# Patient Record
Sex: MALE | Race: WHITE | NOT HISPANIC OR LATINO | URBAN - METROPOLITAN AREA
[De-identification: names, ages, dates, MRNs, and addresses within clinical notes are randomized per-mention and may not be internally consistent; named-entity substitution may affect disease eponyms.]

---

## 2023-03-28 ENCOUNTER — TELEPHONE (OUTPATIENT)
Dept: PRIMARY CARE | Facility: CLINIC | Age: 53
End: 2023-03-28
Payer: COMMERCIAL

## 2023-03-28 DIAGNOSIS — E11.9 TYPE 2 DIABETES MELLITUS WITHOUT COMPLICATION, WITHOUT LONG-TERM CURRENT USE OF INSULIN (MULTI): Primary | ICD-10-CM

## 2023-03-28 RX ORDER — IBUPROFEN 800 MG/1
1 TABLET ORAL EVERY 8 HOURS PRN
COMMUNITY
Start: 2022-04-06

## 2023-03-28 RX ORDER — TADALAFIL 5 MG/1
5 TABLET ORAL DAILY
COMMUNITY

## 2023-03-28 RX ORDER — FLUOXETINE 20 MG/1
1 TABLET ORAL DAILY
COMMUNITY
Start: 2022-09-27 | End: 2023-04-10 | Stop reason: DRUGHIGH

## 2023-03-28 RX ORDER — LANCETS 33 GAUGE
EACH MISCELLANEOUS
COMMUNITY
Start: 2022-08-18 | End: 2023-03-28 | Stop reason: SDUPTHER

## 2023-03-28 RX ORDER — METFORMIN HYDROCHLORIDE 500 MG/1
500 TABLET, EXTENDED RELEASE ORAL DAILY
COMMUNITY
End: 2023-04-10 | Stop reason: SDUPTHER

## 2023-03-28 RX ORDER — ATORVASTATIN CALCIUM 10 MG/1
10 TABLET, FILM COATED ORAL DAILY
COMMUNITY
End: 2023-04-10 | Stop reason: SDUPTHER

## 2023-03-28 RX ORDER — BLOOD-GLUCOSE METER
EACH MISCELLANEOUS
Qty: 100 STRIP | Refills: 3 | Status: SHIPPED | OUTPATIENT
Start: 2023-03-28

## 2023-03-28 RX ORDER — BLOOD-GLUCOSE METER
EACH MISCELLANEOUS
COMMUNITY
End: 2023-03-28 | Stop reason: SDUPTHER

## 2023-03-28 RX ORDER — LANCETS 33 GAUGE
EACH MISCELLANEOUS
Qty: 100 EACH | Refills: 3 | Status: SHIPPED | OUTPATIENT
Start: 2023-03-28 | End: 2023-08-24

## 2023-03-28 NOTE — TELEPHONE ENCOUNTER
Patient left voicemail on provider refill line for:    Name of medication & dose:  One Touch Lancets and Test Strips  Quantity & refills requested: as per last time  Amount of medication remainin days left   If out of medication, for how long?      Last office visit:  22  Last labs (if applicable):    Future appointment?  4/10/23    Pharmacy verified.  [facility] on file  Allergies updated.       The patient was informed the requested medication request will be processed within 3 business days unless they are contacted by a staff member to advise otherwise.

## 2023-04-01 LAB
ALANINE AMINOTRANSFERASE (SGPT) (U/L) IN SER/PLAS: 27 U/L (ref 10–52)
ALBUMIN (G/DL) IN SER/PLAS: 4.4 G/DL (ref 3.4–5)
ALKALINE PHOSPHATASE (U/L) IN SER/PLAS: 52 U/L (ref 33–120)
ANION GAP IN SER/PLAS: 14 MMOL/L (ref 10–20)
ASPARTATE AMINOTRANSFERASE (SGOT) (U/L) IN SER/PLAS: 16 U/L (ref 9–39)
BILIRUBIN TOTAL (MG/DL) IN SER/PLAS: 0.5 MG/DL (ref 0–1.2)
CALCIUM (MG/DL) IN SER/PLAS: 9.2 MG/DL (ref 8.6–10.3)
CARBON DIOXIDE, TOTAL (MMOL/L) IN SER/PLAS: 27 MMOL/L (ref 21–32)
CHLORIDE (MMOL/L) IN SER/PLAS: 103 MMOL/L (ref 98–107)
CHOLESTEROL (MG/DL) IN SER/PLAS: 124 MG/DL (ref 0–199)
CHOLESTEROL IN HDL (MG/DL) IN SER/PLAS: 41.1 MG/DL
CHOLESTEROL/HDL RATIO: 3
CREATININE (MG/DL) IN SER/PLAS: 1.25 MG/DL (ref 0.5–1.3)
ERYTHROCYTE DISTRIBUTION WIDTH (RATIO) BY AUTOMATED COUNT: 13.1 % (ref 11.5–14.5)
ERYTHROCYTE MEAN CORPUSCULAR HEMOGLOBIN CONCENTRATION (G/DL) BY AUTOMATED: 32.8 G/DL (ref 32–36)
ERYTHROCYTE MEAN CORPUSCULAR VOLUME (FL) BY AUTOMATED COUNT: 91 FL (ref 80–100)
ERYTHROCYTES (10*6/UL) IN BLOOD BY AUTOMATED COUNT: 5.15 X10E12/L (ref 4.5–5.9)
ESTIMATED AVERAGE GLUCOSE FOR HBA1C: 128 MG/DL
GFR MALE: 70 ML/MIN/1.73M2
GLUCOSE (MG/DL) IN SER/PLAS: 142 MG/DL (ref 74–99)
HEMATOCRIT (%) IN BLOOD BY AUTOMATED COUNT: 46.7 % (ref 41–52)
HEMOGLOBIN (G/DL) IN BLOOD: 15.3 G/DL (ref 13.5–17.5)
HEMOGLOBIN A1C/HEMOGLOBIN TOTAL IN BLOOD: 6.1 %
LDL: 66 MG/DL (ref 0–99)
LEUKOCYTES (10*3/UL) IN BLOOD BY AUTOMATED COUNT: 9.9 X10E9/L (ref 4.4–11.3)
PLATELETS (10*3/UL) IN BLOOD AUTOMATED COUNT: 267 X10E9/L (ref 150–450)
POTASSIUM (MMOL/L) IN SER/PLAS: 4.1 MMOL/L (ref 3.5–5.3)
PROTEIN TOTAL: 6.9 G/DL (ref 6.4–8.2)
SODIUM (MMOL/L) IN SER/PLAS: 140 MMOL/L (ref 136–145)
THYROTROPIN (MIU/L) IN SER/PLAS BY DETECTION LIMIT <= 0.05 MIU/L: 1.3 MIU/L (ref 0.44–3.98)
TRIGLYCERIDE (MG/DL) IN SER/PLAS: 85 MG/DL (ref 0–149)
UREA NITROGEN (MG/DL) IN SER/PLAS: 13 MG/DL (ref 6–23)
VLDL: 17 MG/DL (ref 0–40)

## 2023-04-10 ENCOUNTER — OFFICE VISIT (OUTPATIENT)
Dept: PRIMARY CARE | Facility: CLINIC | Age: 53
End: 2023-04-10
Payer: COMMERCIAL

## 2023-04-10 VITALS
OXYGEN SATURATION: 97 % | DIASTOLIC BLOOD PRESSURE: 77 MMHG | WEIGHT: 184 LBS | SYSTOLIC BLOOD PRESSURE: 111 MMHG | HEART RATE: 86 BPM | BODY MASS INDEX: 27.25 KG/M2

## 2023-04-10 DIAGNOSIS — E78.3 MIXED HYPERGLYCERIDEMIA: ICD-10-CM

## 2023-04-10 DIAGNOSIS — E11.9 TYPE 2 DIABETES MELLITUS WITHOUT COMPLICATION, WITHOUT LONG-TERM CURRENT USE OF INSULIN (MULTI): Primary | ICD-10-CM

## 2023-04-10 DIAGNOSIS — F33.41 RECURRENT MAJOR DEPRESSIVE DISORDER, IN PARTIAL REMISSION (CMS-HCC): ICD-10-CM

## 2023-04-10 PROCEDURE — 99214 OFFICE O/P EST MOD 30 MIN: CPT | Performed by: FAMILY MEDICINE

## 2023-04-10 PROCEDURE — 1036F TOBACCO NON-USER: CPT | Performed by: FAMILY MEDICINE

## 2023-04-10 PROCEDURE — 3078F DIAST BP <80 MM HG: CPT | Performed by: FAMILY MEDICINE

## 2023-04-10 PROCEDURE — 3044F HG A1C LEVEL LT 7.0%: CPT | Performed by: FAMILY MEDICINE

## 2023-04-10 PROCEDURE — 3074F SYST BP LT 130 MM HG: CPT | Performed by: FAMILY MEDICINE

## 2023-04-10 RX ORDER — FLUOXETINE HYDROCHLORIDE 40 MG/1
40 CAPSULE ORAL DAILY
Qty: 90 CAPSULE | Refills: 1 | Status: SHIPPED | OUTPATIENT
Start: 2023-04-10 | End: 2023-10-05 | Stop reason: SDUPTHER

## 2023-04-10 RX ORDER — ATORVASTATIN CALCIUM 10 MG/1
10 TABLET, FILM COATED ORAL DAILY
Qty: 90 TABLET | Refills: 3 | Status: SHIPPED | OUTPATIENT
Start: 2023-04-10 | End: 2024-03-08 | Stop reason: SDUPTHER

## 2023-04-10 RX ORDER — METFORMIN HYDROCHLORIDE 500 MG/1
500 TABLET, EXTENDED RELEASE ORAL DAILY
Qty: 90 TABLET | Refills: 1 | Status: SHIPPED | OUTPATIENT
Start: 2023-04-10 | End: 2023-10-05 | Stop reason: SDUPTHER

## 2023-04-10 ASSESSMENT — PATIENT HEALTH QUESTIONNAIRE - PHQ9
SUM OF ALL RESPONSES TO PHQ9 QUESTIONS 1 AND 2: 0
2. FEELING DOWN, DEPRESSED OR HOPELESS: NOT AT ALL
1. LITTLE INTEREST OR PLEASURE IN DOING THINGS: NOT AT ALL

## 2023-04-10 NOTE — PROGRESS NOTES
Subjective   Patient ID: Zachary Early is a 52 y.o. male who presents for Diabetes (A1c 6.1 on 4/1/23).  He is also follow up on his depression.  His wife is with him.     He has been struggling off and on with his lifestyle and his mood.  For a while after starting the Prozac he did a lot better, but then recently he backslid.  His wife says he always feels worse in the winter, and he agrees.  Recently he started exercising again which has helped.  When he checks his blood sugar it is usually normal.  He denies any side effects from medications.    Current stressors include his job and his mother's failing health.  He has been trying to exercise.      Histories reviewed.  Recent labs reviewed and compared to last years    Objective   /77   Pulse 86   Wt 83.5 kg (184 lb)   SpO2 97%   BMI 27.25 kg/m²     Alert adult, NAD, body wt WNL  Affect pleasant and appropriate  Neck supple, No LAD, No thyromegaly  Heart RRR no murmur  Lungs CTA bilat  Skin warm dry and clear      Assessment/Plan   Problem List Items Addressed This Visit    None  Visit Diagnoses       Type 2 diabetes mellitus without complication, without long-term current use of insulin (CMS/HCC)    -  Primary    Relevant Medications    metFORMIN XR (Glucophage-XR) 500 mg 24 hr tablet    Mixed hyperglyceridemia        Relevant Medications    atorvastatin (Lipitor) 10 mg tablet    Recurrent major depressive disorder, in partial remission (CMS/HCC)        Relevant Medications    FLUoxetine (PROzac) 40 mg capsule          Diabetes well controlled, continue current medications  Follow-up in 6 months    Depression is not well controlled though improved compared to last year.  We will increase his Loxitane to 40 mg daily.  I encouraged him to limit his screen time after we reviewed his total for the past week or so.  Encouraged continued exercise and I asked him to please consider talk therapy as a treatment option.

## 2023-07-25 ENCOUNTER — TELEPHONE VISIT (OUTPATIENT)
Dept: PRIMARY CARE | Facility: CLINIC | Age: 53
End: 2023-07-25
Payer: COMMERCIAL

## 2023-07-25 DIAGNOSIS — M25.511 CHRONIC RIGHT SHOULDER PAIN: Primary | ICD-10-CM

## 2023-07-25 DIAGNOSIS — G89.29 CHRONIC RIGHT SHOULDER PAIN: Primary | ICD-10-CM

## 2023-07-25 NOTE — PROGRESS NOTES
Pt has hx of right shoulder issues.  In 2021 he was seeing PT and was referred for a Cortisone shot with Ortho which really helped.  He wants a new referral for another shot, has been having sxs.  Pt here with wife for her appt.  Referral given in person.  Socorro Duong MD

## 2023-08-18 DIAGNOSIS — E11.9 TYPE 2 DIABETES MELLITUS WITHOUT COMPLICATION, WITHOUT LONG-TERM CURRENT USE OF INSULIN (MULTI): ICD-10-CM

## 2023-08-24 RX ORDER — LANCETS 33 GAUGE
EACH MISCELLANEOUS
Qty: 100 EACH | Refills: 3 | Status: SHIPPED | OUTPATIENT
Start: 2023-08-24

## 2023-10-05 ENCOUNTER — OFFICE VISIT (OUTPATIENT)
Dept: PRIMARY CARE | Facility: CLINIC | Age: 53
End: 2023-10-05
Payer: COMMERCIAL

## 2023-10-05 VITALS
HEART RATE: 76 BPM | BODY MASS INDEX: 28.29 KG/M2 | OXYGEN SATURATION: 95 % | WEIGHT: 191 LBS | DIASTOLIC BLOOD PRESSURE: 85 MMHG | SYSTOLIC BLOOD PRESSURE: 130 MMHG

## 2023-10-05 DIAGNOSIS — E78.3 MIXED HYPERGLYCERIDEMIA: ICD-10-CM

## 2023-10-05 DIAGNOSIS — F33.41 RECURRENT MAJOR DEPRESSIVE DISORDER, IN PARTIAL REMISSION (CMS-HCC): ICD-10-CM

## 2023-10-05 DIAGNOSIS — E11.9 TYPE 2 DIABETES MELLITUS WITHOUT COMPLICATION, WITHOUT LONG-TERM CURRENT USE OF INSULIN (MULTI): Primary | ICD-10-CM

## 2023-10-05 LAB — POC HEMOGLOBIN A1C: 6.7 % (ref 4.2–6.5)

## 2023-10-05 PROCEDURE — 90750 HZV VACC RECOMBINANT IM: CPT | Performed by: FAMILY MEDICINE

## 2023-10-05 PROCEDURE — 99214 OFFICE O/P EST MOD 30 MIN: CPT | Performed by: FAMILY MEDICINE

## 2023-10-05 PROCEDURE — 3044F HG A1C LEVEL LT 7.0%: CPT | Performed by: FAMILY MEDICINE

## 2023-10-05 PROCEDURE — 83036 HEMOGLOBIN GLYCOSYLATED A1C: CPT | Performed by: FAMILY MEDICINE

## 2023-10-05 PROCEDURE — 90471 IMMUNIZATION ADMIN: CPT | Performed by: FAMILY MEDICINE

## 2023-10-05 PROCEDURE — 3079F DIAST BP 80-89 MM HG: CPT | Performed by: FAMILY MEDICINE

## 2023-10-05 PROCEDURE — 1036F TOBACCO NON-USER: CPT | Performed by: FAMILY MEDICINE

## 2023-10-05 PROCEDURE — 3075F SYST BP GE 130 - 139MM HG: CPT | Performed by: FAMILY MEDICINE

## 2023-10-05 RX ORDER — METFORMIN HYDROCHLORIDE 500 MG/1
500 TABLET, EXTENDED RELEASE ORAL DAILY
Qty: 90 TABLET | Refills: 1 | Status: SHIPPED | OUTPATIENT
Start: 2023-10-05 | End: 2024-03-08 | Stop reason: SDUPTHER

## 2023-10-05 RX ORDER — FLUOXETINE HYDROCHLORIDE 40 MG/1
40 CAPSULE ORAL DAILY
Qty: 90 CAPSULE | Refills: 1 | Status: SHIPPED | OUTPATIENT
Start: 2023-10-05 | End: 2024-03-08 | Stop reason: SDUPTHER

## 2023-10-05 NOTE — PROGRESS NOTES
Subjective   Patient ID: Zachary Early is a 52 y.o. male who presents for Diabetes (Diabetes follow up).  His BS at home has been a little worse in recent months due to eating dinner too late.  He is getting first AM BS as high as 160s at times.      Histories reviewed      Objective   /85   Pulse 76   Wt 86.6 kg (191 lb)   SpO2 95%   BMI 28.29 kg/m²    Physical Exam  Alert adult, NAD  Affect pleasant  Heart RRR no murmur  Lungs CTA bilat      Problem List Items Addressed This Visit    None  Visit Diagnoses         Codes    Type 2 diabetes mellitus without complication, without long-term current use of insulin (CMS/[facility])    -  Primary E11.9    Relevant Medications    metFORMIN  mg 24 hr tablet    Other Relevant Orders    POCT glycosylated hemoglobin (Hb A1C) manually resulted (Completed)    Basic Metabolic Panel    Hemoglobin A1C    Albumin , Urine Random    Recurrent major depressive disorder, in partial remission (CMS/[facility])     F33.41    Relevant Medications    FLUoxetine (PROzac) 40 mg capsule    Mixed hyperglyceridemia     E78.3    Relevant Orders    Lipid Panel    Basic Metabolic Panel          A1C went up a little, reviewed diet and need for more consistency  Follow up 6 months

## 2023-10-20 PROBLEM — E78.3 MIXED HYPERGLYCERIDEMIA: Status: ACTIVE | Noted: 2023-10-20

## 2023-10-20 PROBLEM — E11.9 TYPE 2 DIABETES MELLITUS WITHOUT COMPLICATION, WITHOUT LONG-TERM CURRENT USE OF INSULIN (MULTI): Status: ACTIVE | Noted: 2023-10-20

## 2023-10-20 PROBLEM — F33.41 RECURRENT MAJOR DEPRESSIVE DISORDER, IN PARTIAL REMISSION (CMS-HCC): Status: ACTIVE | Noted: 2023-10-20

## 2024-03-02 ENCOUNTER — LAB (OUTPATIENT)
Dept: LAB | Facility: LAB | Age: 54
End: 2024-03-02
Payer: COMMERCIAL

## 2024-03-02 DIAGNOSIS — E78.3 MIXED HYPERGLYCERIDEMIA: ICD-10-CM

## 2024-03-02 DIAGNOSIS — E11.9 TYPE 2 DIABETES MELLITUS WITHOUT COMPLICATION, WITHOUT LONG-TERM CURRENT USE OF INSULIN (MULTI): ICD-10-CM

## 2024-03-02 LAB
ANION GAP SERPL CALC-SCNC: 13 MMOL/L (ref 10–20)
BUN SERPL-MCNC: 10 MG/DL (ref 6–23)
CALCIUM SERPL-MCNC: 9.7 MG/DL (ref 8.6–10.3)
CHLORIDE SERPL-SCNC: 103 MMOL/L (ref 98–107)
CHOLEST SERPL-MCNC: 145 MG/DL (ref 0–199)
CHOLESTEROL/HDL RATIO: 3.5
CO2 SERPL-SCNC: 27 MMOL/L (ref 21–32)
CREAT SERPL-MCNC: 1.14 MG/DL (ref 0.5–1.3)
CREAT UR-MCNC: 181.2 MG/DL (ref 20–370)
EGFRCR SERPLBLD CKD-EPI 2021: 78 ML/MIN/1.73M*2
EST. AVERAGE GLUCOSE BLD GHB EST-MCNC: 169 MG/DL
GLUCOSE SERPL-MCNC: 184 MG/DL (ref 74–99)
HBA1C MFR BLD: 7.5 %
HDLC SERPL-MCNC: 41.9 MG/DL
LDLC SERPL CALC-MCNC: 72 MG/DL
MICROALBUMIN UR-MCNC: 7.6 MG/L
MICROALBUMIN/CREAT UR: 4.2 UG/MG CREAT
NON HDL CHOLESTEROL: 103 MG/DL (ref 0–149)
POTASSIUM SERPL-SCNC: 4.2 MMOL/L (ref 3.5–5.3)
SODIUM SERPL-SCNC: 139 MMOL/L (ref 136–145)
TRIGL SERPL-MCNC: 154 MG/DL (ref 0–149)
VLDL: 31 MG/DL (ref 0–40)

## 2024-03-02 PROCEDURE — 83036 HEMOGLOBIN GLYCOSYLATED A1C: CPT

## 2024-03-02 PROCEDURE — 36415 COLL VENOUS BLD VENIPUNCTURE: CPT

## 2024-03-02 PROCEDURE — 80061 LIPID PANEL: CPT

## 2024-03-02 PROCEDURE — 82043 UR ALBUMIN QUANTITATIVE: CPT

## 2024-03-02 PROCEDURE — 80048 BASIC METABOLIC PNL TOTAL CA: CPT

## 2024-03-02 PROCEDURE — 82570 ASSAY OF URINE CREATININE: CPT

## 2024-03-08 ENCOUNTER — OFFICE VISIT (OUTPATIENT)
Dept: PRIMARY CARE | Facility: CLINIC | Age: 54
End: 2024-03-08
Payer: COMMERCIAL

## 2024-03-08 VITALS
BODY MASS INDEX: 30.21 KG/M2 | DIASTOLIC BLOOD PRESSURE: 81 MMHG | OXYGEN SATURATION: 95 % | HEART RATE: 80 BPM | HEIGHT: 69 IN | SYSTOLIC BLOOD PRESSURE: 119 MMHG | WEIGHT: 204 LBS

## 2024-03-08 DIAGNOSIS — H93.13 TINNITUS OF BOTH EARS: ICD-10-CM

## 2024-03-08 DIAGNOSIS — F33.41 RECURRENT MAJOR DEPRESSIVE DISORDER, IN PARTIAL REMISSION (CMS-HCC): ICD-10-CM

## 2024-03-08 DIAGNOSIS — Z12.11 SCREENING FOR COLON CANCER: ICD-10-CM

## 2024-03-08 DIAGNOSIS — E78.3 MIXED HYPERGLYCERIDEMIA: ICD-10-CM

## 2024-03-08 DIAGNOSIS — E11.9 TYPE 2 DIABETES MELLITUS WITHOUT COMPLICATION, WITHOUT LONG-TERM CURRENT USE OF INSULIN (MULTI): ICD-10-CM

## 2024-03-08 DIAGNOSIS — Z00.00 WELL ADULT EXAM: Primary | ICD-10-CM

## 2024-03-08 DIAGNOSIS — H91.93 DECREASED HEARING OF BOTH EARS: ICD-10-CM

## 2024-03-08 PROCEDURE — 99214 OFFICE O/P EST MOD 30 MIN: CPT | Performed by: FAMILY MEDICINE

## 2024-03-08 PROCEDURE — 3074F SYST BP LT 130 MM HG: CPT | Performed by: FAMILY MEDICINE

## 2024-03-08 PROCEDURE — 3079F DIAST BP 80-89 MM HG: CPT | Performed by: FAMILY MEDICINE

## 2024-03-08 PROCEDURE — 3061F NEG MICROALBUMINURIA REV: CPT | Performed by: FAMILY MEDICINE

## 2024-03-08 PROCEDURE — 3048F LDL-C <100 MG/DL: CPT | Performed by: FAMILY MEDICINE

## 2024-03-08 PROCEDURE — 3051F HG A1C>EQUAL 7.0%<8.0%: CPT | Performed by: FAMILY MEDICINE

## 2024-03-08 PROCEDURE — 1036F TOBACCO NON-USER: CPT | Performed by: FAMILY MEDICINE

## 2024-03-08 PROCEDURE — 99396 PREV VISIT EST AGE 40-64: CPT | Performed by: FAMILY MEDICINE

## 2024-03-08 RX ORDER — ATORVASTATIN CALCIUM 10 MG/1
10 TABLET, FILM COATED ORAL DAILY
Qty: 90 TABLET | Refills: 3 | Status: SHIPPED | OUTPATIENT
Start: 2024-03-08

## 2024-03-08 RX ORDER — METFORMIN HYDROCHLORIDE 500 MG/1
500 TABLET, EXTENDED RELEASE ORAL DAILY
Qty: 90 TABLET | Refills: 1 | Status: SHIPPED | OUTPATIENT
Start: 2024-03-08

## 2024-03-08 RX ORDER — FLUOXETINE HYDROCHLORIDE 40 MG/1
40 CAPSULE ORAL DAILY
Qty: 90 CAPSULE | Refills: 1 | Status: SHIPPED | OUTPATIENT
Start: 2024-03-08 | End: 2024-09-04

## 2024-03-08 ASSESSMENT — PATIENT HEALTH QUESTIONNAIRE - PHQ9
SUM OF ALL RESPONSES TO PHQ9 QUESTIONS 1 AND 2: 0
1. LITTLE INTEREST OR PLEASURE IN DOING THINGS: NOT AT ALL
2. FEELING DOWN, DEPRESSED OR HOPELESS: NOT AT ALL

## 2024-03-08 ASSESSMENT — ENCOUNTER SYMPTOMS
OCCASIONAL FEELINGS OF UNSTEADINESS: 0
DEPRESSION: 0
LOSS OF SENSATION IN FEET: 0

## 2024-03-08 NOTE — PROGRESS NOTES
"  Subjective   Patient ID: Zachary Earyl is a 53 y.o. male who presents for Annual Exam (Patient is here for annual physical. He is worried about the ringing in his ears, says its worse on the right.).  He is also due for DM follow up.    Past Medical, Surgical, Social and Family Hx reviewed-Yes    Any acute complaints?    Just the tinnitus    Any chronic issues addressed today or Rx refills needed?    Hyperlipidemia and type 2 DM      Colon CA screening Hx   Labs recent labs reviewed  Up to date on immunizations yes  Dentist in the past year yes    ROS:  HEENT negative  GI negative   negative  Cardiac negative  Resp negative  Sexual Activity no concerns  Skin negative      PE:  /81   Pulse 80   Ht 1.753 m (5' 9\")   Wt 92.5 kg (204 lb)   SpO2 95%   BMI 30.13 kg/m²   Alert adult man, NAD  HEENT clear  Neck supple, No LAD  Heart RRR no murmur  Lungs CTA bilat  Abdomen benign, normal BS, soft NT/ND  Skin warm, dry, clear  Neuro grossly normal, gait WNL  Affect pleasant and appropriate, memory and judgement WNL      Assessment/Plan   Problem List Items Addressed This Visit             ICD-10-CM    Type 2 diabetes mellitus without complication, without long-term current use of insulin (CMS/HCC) E11.9    Relevant Medications    metFORMIN  mg 24 hr tablet    Recurrent major depressive disorder, in partial remission (CMS/HCC) F33.41    Relevant Medications    FLUoxetine (PROzac) 40 mg capsule    Mixed hyperglyceridemia E78.3    Relevant Medications    atorvastatin (Lipitor) 10 mg tablet     Other Visit Diagnoses         Codes    Well adult exam    -  Primary Z00.00    Decreased hearing of both ears     H91.93    Relevant Orders    Referral to Audiology    Tinnitus of both ears     H93.13    Relevant Orders    Referral to Audiology    Screening for colon cancer     Z12.11    Relevant Orders    Colonoscopy Screening; Average Risk Patient                 "

## 2024-09-13 ENCOUNTER — APPOINTMENT (OUTPATIENT)
Dept: PRIMARY CARE | Facility: CLINIC | Age: 54
End: 2024-09-13
Payer: COMMERCIAL

## 2024-09-13 VITALS
DIASTOLIC BLOOD PRESSURE: 84 MMHG | SYSTOLIC BLOOD PRESSURE: 125 MMHG | OXYGEN SATURATION: 95 % | BODY MASS INDEX: 30.86 KG/M2 | WEIGHT: 209 LBS

## 2024-09-13 DIAGNOSIS — F33.41 RECURRENT MAJOR DEPRESSIVE DISORDER, IN PARTIAL REMISSION (CMS-HCC): ICD-10-CM

## 2024-09-13 DIAGNOSIS — T17.308A CHOKING, INITIAL ENCOUNTER: ICD-10-CM

## 2024-09-13 DIAGNOSIS — E11.9 TYPE 2 DIABETES MELLITUS WITHOUT COMPLICATION, WITHOUT LONG-TERM CURRENT USE OF INSULIN (MULTI): Primary | ICD-10-CM

## 2024-09-13 DIAGNOSIS — R53.83 OTHER FATIGUE: ICD-10-CM

## 2024-09-13 DIAGNOSIS — E78.3 MIXED HYPERGLYCERIDEMIA: ICD-10-CM

## 2024-09-13 PROCEDURE — 90471 IMMUNIZATION ADMIN: CPT | Performed by: FAMILY MEDICINE

## 2024-09-13 PROCEDURE — 1036F TOBACCO NON-USER: CPT | Performed by: FAMILY MEDICINE

## 2024-09-13 PROCEDURE — 3048F LDL-C <100 MG/DL: CPT | Performed by: FAMILY MEDICINE

## 2024-09-13 PROCEDURE — 3061F NEG MICROALBUMINURIA REV: CPT | Performed by: FAMILY MEDICINE

## 2024-09-13 PROCEDURE — 3074F SYST BP LT 130 MM HG: CPT | Performed by: FAMILY MEDICINE

## 2024-09-13 PROCEDURE — 90715 TDAP VACCINE 7 YRS/> IM: CPT | Performed by: FAMILY MEDICINE

## 2024-09-13 PROCEDURE — 99214 OFFICE O/P EST MOD 30 MIN: CPT | Performed by: FAMILY MEDICINE

## 2024-09-13 PROCEDURE — 3051F HG A1C>EQUAL 7.0%<8.0%: CPT | Performed by: FAMILY MEDICINE

## 2024-09-13 PROCEDURE — 3079F DIAST BP 80-89 MM HG: CPT | Performed by: FAMILY MEDICINE

## 2024-09-13 RX ORDER — FLUOXETINE HYDROCHLORIDE 40 MG/1
40 CAPSULE ORAL DAILY
Qty: 90 CAPSULE | Refills: 1 | Status: SHIPPED | OUTPATIENT
Start: 2024-09-13 | End: 2025-03-12

## 2024-09-13 RX ORDER — METFORMIN HYDROCHLORIDE 500 MG/1
1000 TABLET, EXTENDED RELEASE ORAL DAILY
Qty: 180 TABLET | Refills: 1 | Status: SHIPPED | OUTPATIENT
Start: 2024-09-13

## 2024-09-13 NOTE — PROGRESS NOTES
Subjective   Patient ID: Zachary Early is a 53 y.o. male who presents for Diabetes (Patient is here for diabetes follow up. He would like to discuss his choking issues. ).  Wife is with him.      Due to some family stresses they have been eating less healthy    He checks his BS at home but always in the AM first thing.  Fasting has been around 150-160s.      He says he has always had coking issues with pills or food, ever since he was young.  It happens every few months or so.  He even had to call 911 a few years ago when he choked on meat.  Other times they have driven to the ER in the past year, but then the choking was relieved in the parking lot.      His daughter had surgery years ago for a vascular ring, but he never brought it up.    Histories reviewed      Objective   /84   Wt 94.8 kg (209 lb)   SpO2 95%   BMI 30.86 kg/m²    Physical Exam    Alert adult, NAD  Affect pleasant  Heart RRR no murmur  Lungs CTA bilat    HgbA1C today 8.6    Problem List Items Addressed This Visit             ICD-10-CM    Type 2 diabetes mellitus without complication, without long-term current use of insulin (Multi) - Primary E11.9    Relevant Medications    metFORMIN  mg 24 hr tablet    Other Relevant Orders    Comprehensive Metabolic Panel    Hemoglobin A1C    Recurrent major depressive disorder, in partial remission (CMS-HCC) F33.41    Relevant Medications    FLUoxetine (PROzac) 40 mg capsule    Mixed hyperglyceridemia E78.3    Relevant Orders    Lipid Panel    Comprehensive Metabolic Panel     Other Visit Diagnoses         Codes    Choking, initial encounter     T17.308A    Relevant Orders    Referral to ENT    Other fatigue     R53.83    Relevant Orders    Testosterone, total and free          Pt agreeable to increasing metformin to bid, but he is very motivated  to correct his diet also.     Follow up 6 months

## 2024-09-16 ENCOUNTER — ANESTHESIA EVENT (OUTPATIENT)
Dept: OPERATING ROOM | Facility: CLINIC | Age: 54
End: 2024-09-16
Payer: COMMERCIAL

## 2024-09-18 ENCOUNTER — ANESTHESIA (OUTPATIENT)
Dept: OPERATING ROOM | Facility: CLINIC | Age: 54
End: 2024-09-18
Payer: COMMERCIAL

## 2024-09-18 ENCOUNTER — HOSPITAL ENCOUNTER (OUTPATIENT)
Dept: OPERATING ROOM | Facility: CLINIC | Age: 54
Discharge: HOME | End: 2024-09-18
Payer: COMMERCIAL

## 2024-09-18 VITALS
HEIGHT: 70 IN | OXYGEN SATURATION: 95 % | RESPIRATION RATE: 16 BRPM | BODY MASS INDEX: 29.67 KG/M2 | WEIGHT: 207.23 LBS | SYSTOLIC BLOOD PRESSURE: 118 MMHG | HEART RATE: 64 BPM | DIASTOLIC BLOOD PRESSURE: 79 MMHG | TEMPERATURE: 96.8 F

## 2024-09-18 DIAGNOSIS — Z12.11 SCREENING FOR COLON CANCER: ICD-10-CM

## 2024-09-18 LAB — GLUCOSE BLD MANUAL STRIP-MCNC: 190 MG/DL (ref 74–99)

## 2024-09-18 PROCEDURE — 3600000002 HC OR TIME - INITIAL BASE CHARGE - PROCEDURE LEVEL TWO: Performed by: ANESTHESIOLOGY

## 2024-09-18 PROCEDURE — 7100000009 HC PHASE TWO TIME - INITIAL BASE CHARGE: Performed by: ANESTHESIOLOGY

## 2024-09-18 PROCEDURE — 7100000010 HC PHASE TWO TIME - EACH INCREMENTAL 1 MINUTE: Performed by: ANESTHESIOLOGY

## 2024-09-18 PROCEDURE — 45385 COLONOSCOPY W/LESION REMOVAL: CPT | Performed by: INTERNAL MEDICINE

## 2024-09-18 PROCEDURE — 3600000007 HC OR TIME - EACH INCREMENTAL 1 MINUTE - PROCEDURE LEVEL TWO: Performed by: ANESTHESIOLOGY

## 2024-09-18 PROCEDURE — A45385 PR COLONOSCOPY,REMV LESN,SNARE

## 2024-09-18 PROCEDURE — 82947 ASSAY GLUCOSE BLOOD QUANT: CPT

## 2024-09-18 PROCEDURE — 3700000001 HC GENERAL ANESTHESIA TIME - INITIAL BASE CHARGE: Performed by: ANESTHESIOLOGY

## 2024-09-18 PROCEDURE — 2500000004 HC RX 250 GENERAL PHARMACY W/ HCPCS (ALT 636 FOR OP/ED)

## 2024-09-18 PROCEDURE — A45385 PR COLONOSCOPY,REMV LESN,SNARE: Performed by: ANESTHESIOLOGY

## 2024-09-18 PROCEDURE — 3700000002 HC GENERAL ANESTHESIA TIME - EACH INCREMENTAL 1 MINUTE: Performed by: ANESTHESIOLOGY

## 2024-09-18 RX ORDER — ALBUTEROL SULFATE 0.83 MG/ML
2.5 SOLUTION RESPIRATORY (INHALATION) ONCE AS NEEDED
Status: DISCONTINUED | OUTPATIENT
Start: 2024-09-18 | End: 2024-09-19 | Stop reason: HOSPADM

## 2024-09-18 RX ORDER — ONDANSETRON HYDROCHLORIDE 2 MG/ML
4 INJECTION, SOLUTION INTRAVENOUS ONCE AS NEEDED
Status: DISCONTINUED | OUTPATIENT
Start: 2024-09-18 | End: 2024-09-19 | Stop reason: HOSPADM

## 2024-09-18 RX ORDER — SODIUM CHLORIDE, SODIUM LACTATE, POTASSIUM CHLORIDE, CALCIUM CHLORIDE 600; 310; 30; 20 MG/100ML; MG/100ML; MG/100ML; MG/100ML
100 INJECTION, SOLUTION INTRAVENOUS CONTINUOUS
Status: DISCONTINUED | OUTPATIENT
Start: 2024-09-18 | End: 2024-09-19 | Stop reason: HOSPADM

## 2024-09-18 RX ORDER — ACETAMINOPHEN 325 MG/1
650 TABLET ORAL EVERY 4 HOURS PRN
Status: DISCONTINUED | OUTPATIENT
Start: 2024-09-18 | End: 2024-09-19 | Stop reason: HOSPADM

## 2024-09-18 RX ORDER — LIDOCAINE IN NACL,ISO-OSMOT/PF 30 MG/3 ML
0.1 SYRINGE (ML) INJECTION ONCE
Status: DISCONTINUED | OUTPATIENT
Start: 2024-09-18 | End: 2024-09-19 | Stop reason: HOSPADM

## 2024-09-18 RX ORDER — PROPOFOL 10 MG/ML
INJECTION, EMULSION INTRAVENOUS CONTINUOUS PRN
Status: DISCONTINUED | OUTPATIENT
Start: 2024-09-18 | End: 2024-09-18

## 2024-09-18 SDOH — HEALTH STABILITY: MENTAL HEALTH: CURRENT SMOKER: 0

## 2024-09-18 ASSESSMENT — PAIN - FUNCTIONAL ASSESSMENT
PAIN_FUNCTIONAL_ASSESSMENT: 0-10

## 2024-09-18 ASSESSMENT — COLUMBIA-SUICIDE SEVERITY RATING SCALE - C-SSRS
6. HAVE YOU EVER DONE ANYTHING, STARTED TO DO ANYTHING, OR PREPARED TO DO ANYTHING TO END YOUR LIFE?: NO
1. IN THE PAST MONTH, HAVE YOU WISHED YOU WERE DEAD OR WISHED YOU COULD GO TO SLEEP AND NOT WAKE UP?: NO
2. HAVE YOU ACTUALLY HAD ANY THOUGHTS OF KILLING YOURSELF?: NO

## 2024-09-18 ASSESSMENT — PAIN SCALES - GENERAL
PAINLEVEL_OUTOF10: 0 - NO PAIN

## 2024-09-18 NOTE — DISCHARGE INSTRUCTIONS
During the first 24 hours after your procedure, you should:    - Resume normal diet, unless otherwise directed by your doctor.  - Resume your home medications, unless otherwise directed by your doctor.  - Refrain from driving or operative heavy machinery.  - Drink plenty of liquids.  - Avoid consuming alcohol.  - Avoid strenuous activity or heavy lifting.    After 24 hours, you can resume regular activity.    Call your doctor office immediately ([phone]) or come to the nearest emergency room if you experience:    - Abdominal tenderness  - Blood in your stool or vomit  - Difficulty urinating or passing stools  - Difficulty breathing  - Chest pain  - Fever

## 2024-09-18 NOTE — ANESTHESIA POSTPROCEDURE EVALUATION
Patient: Zachary Early    Procedure Summary       Date: 09/18/24 Room / Location: [facility] OR    Anesthesia Start: 0855 Anesthesia Stop: 0923    Procedure: COLONOSCOPY Diagnosis: Screening for colon cancer    Scheduled Providers: Jean-Claude Cevallos MD Responsible Provider: Laurie Cole MD    Anesthesia Type: MAC ASA Status: 3            Anesthesia Type: MAC    Vitals Value Taken Time   /79 09/18/24 0951   Temp 36 °C (96.8 °F) 09/18/24 0951   Pulse 64 09/18/24 0951   Resp 16 09/18/24 0951   SpO2 95 % 09/18/24 0951       Anesthesia Post Evaluation    Patient location during evaluation: PACU  Patient participation: complete - patient participated  Level of consciousness: awake and alert  Pain management: adequate  Airway patency: patent  Cardiovascular status: acceptable  Respiratory status: acceptable  Hydration status: acceptable  Postoperative Nausea and Vomiting: none        There were no known notable events for this encounter.

## 2024-09-18 NOTE — ANESTHESIA PREPROCEDURE EVALUATION
Patient: Zachary Early    Procedure Information       Anesthesia Start Date/Time: 09/18/24 0855    Scheduled providers: Jean-Claude Cevallos MD    Procedure: COLONOSCOPY    Location: [facility] OR          54 y/o male here for colonoscopy     Relevant Problems   Cardiac   (+) Mixed hyperglyceridemia      Neuro   (+) Recurrent major depressive disorder, in partial remission (CMS-HCC)      Endocrine   (+) Type 2 diabetes mellitus without complication, without long-term current use of insulin (Multi)     Vitals:    09/18/24 0809   BP: 113/74   Pulse: 64   Resp: 18   Temp: 36.1 °C (97 °F)   SpO2: 96%       Past Surgical History:   Procedure Laterality Date    OTHER SURGICAL HISTORY  05/12/2021    Cholecystectomy     Past Medical History:   Diagnosis Date    Calculus of gallbladder without cholecystitis without obstruction 04/27/2021    Gallstones    Overweight 05/29/2021    Overweight    Personal history of other diseases of the digestive system 04/27/2021    History of gastritis    Type 2 diabetes mellitus with hyperglycemia (Multi) 08/20/2021    Uncontrolled type 2 diabetes mellitus with hyperglycemia       Current Outpatient Medications:     atorvastatin (Lipitor) 10 mg tablet, Take 1 tablet (10 mg) by mouth once daily., Disp: 90 tablet, Rfl: 3    FLUoxetine (PROzac) 40 mg capsule, Take 1 capsule (40 mg) by mouth once daily., Disp: 90 capsule, Rfl: 1    metFORMIN  mg 24 hr tablet, Take 2 tablets (1,000 mg) by mouth once daily., Disp: 180 tablet, Rfl: 1    OneTouch Delica Plus Lancet 30 gauge misc, use daily as directed for type 2 diabetes, Disp: 100 each, Rfl: 3    OneTouch Verio test strips strip, use to test once daily, Disp: 100 strip, Rfl: 3     mg tablet, Take 1 tablet (800 mg) by mouth every 8 hours if needed for pain., Disp: , Rfl:     tadalafil (Cialis) 5 mg tablet, Take 1 tablet (5 mg) by mouth once daily., Disp: , Rfl:   No current facility-administered medications for this  encounter.    Facility-Administered Medications Ordered in Other Encounters:     lactated Ringer's bolus, , intravenous, Continuous PRN, VERÓNICA Betancourt, Stopped at 09/18/24 0916    propofol (Diprivan) bolus from bag, , intravenous, PRN, VERÓNICA Betancourt, 20 mg at 09/18/24 0859    propofol (Diprivan) infusion, , intravenous, Continuous PRN, VERÓNICA Betancourt, Stopped at 09/18/24 0915  Prior to Admission medications    Medication Sig Start Date End Date Taking? Authorizing Provider   atorvastatin (Lipitor) 10 mg tablet Take 1 tablet (10 mg) by mouth once daily. 3/8/24  Yes Socorro Duong MD   FLUoxetine (PROzac) 40 mg capsule Take 1 capsule (40 mg) by mouth once daily. 9/13/24 3/12/25 Yes Socorro Duong MD   metFORMIN  mg 24 hr tablet Take 2 tablets (1,000 mg) by mouth once daily. 9/13/24  Yes MD Frances WareTouch Delica Plus Lancet 30 gauge misc use daily as directed for type 2 diabetes 8/24/23  Yes MD Frances WareTouch Verio test strips strip use to test once daily 3/28/23  Yes Socorro Duong MD    mg tablet Take 1 tablet (800 mg) by mouth every 8 hours if needed for pain. 4/6/22   Historical Provider, MD   tadalafil (Cialis) 5 mg tablet Take 1 tablet (5 mg) by mouth once daily.    Historical Provider, MD   FLUoxetine (PROzac) 40 mg capsule Take 1 capsule (40 mg) by mouth once daily. 3/8/24 9/13/24  Socorro Duong MD   metFORMIN  mg 24 hr tablet Take 1 tablet (500 mg) by mouth once daily. 3/8/24 9/13/24  Socorro Duong MD     No Known Allergies  Social History     Tobacco Use    Smoking status: Never    Smokeless tobacco: Never   Substance Use Topics    Alcohol use: Yes         Chemistry    Lab Results   Component Value Date/Time     03/02/2024 0917    K 4.2 03/02/2024 0917     03/02/2024 0917    CO2 27 03/02/2024 0917    BUN 10 03/02/2024 0917    CREATININE 1.14 03/02/2024 0917    Lab Results   Component Value Date/Time    CALCIUM  9.7 03/02/2024 0917    ALKPHOS 52 04/01/2023 0855    AST 16 04/01/2023 0855    ALT 27 04/01/2023 0855    BILITOT 0.5 04/01/2023 0855          Lab Results   Component Value Date/Time    WBC 9.9 04/01/2023 0855    HGB 15.3 04/01/2023 0855    HCT 46.7 04/01/2023 0855     04/01/2023 0855       Clinical information reviewed:   Tobacco  Allergies  Meds   Med Hx  Surg Hx   Fam Hx  Soc Hx        NPO Detail:  NPO/Void Status  Date of Last Liquid: 09/18/24  Time of Last Liquid: 0600  Date of Last Solid: 09/17/24  Time of Last Solid: 0800         Physical Exam    Airway  Mallampati: IV  TM distance: >3 FB  Neck ROM: full     Cardiovascular - normal exam  Rhythm: regular     Dental - normal exam     Pulmonary - normal exam     Abdominal - normal exam             Anesthesia Plan    History of general anesthesia?: yes  History of complications of general anesthesia?: no    ASA 3     MAC   (GA-TIVA)  The patient is not a current smoker.    intravenous induction   Anesthetic plan and risks discussed with patient.    Plan discussed with attending and CAA.

## 2024-09-18 NOTE — H&P
Subjective     History of Present Illness:   Zachary Early is a 53 y.o. male who presents to endoscopy for his initial average-risk screening colonoscopy.  Patient denies having any GI symptoms.      Review of Systems  Constitutional: denies in acute distress  Cardiovascular: denies chest pain  Respiratory: denies shortness of breath  GI: see HPI  Neurologic: denies altered mental status  Dermatology: denies jaundice    Past Medical History   has a past medical history of Calculus of gallbladder without cholecystitis without obstruction (04/27/2021), Overweight (05/29/2021), Personal history of other diseases of the digestive system (04/27/2021), and Type 2 diabetes mellitus with hyperglycemia (Multi) (08/20/2021).     Social History   reports that he has never smoked. He has never used smokeless tobacco. He reports current alcohol use. He reports that he does not use drugs.     Family History  family history is not on file.     Allergies  No Known Allergies    Medications  Current Outpatient Medications   Medication Instructions    atorvastatin (LIPITOR) 10 mg, oral, Daily    FLUoxetine (PROZAC) 40 mg, oral, Daily     mg tablet 1 tablet, oral, Every 8 hours PRN    metFORMIN XR (GLUCOPHAGE-XR) 1,000 mg, oral, Daily    OneTouch Delica Plus Lancet 30 gauge misc use daily as directed for type 2 diabetes    OneTouch Verio test strips strip use to test once daily    tadalafil (CIALIS) 5 mg, oral, Daily        Objective   Visit Vitals  /74   Pulse 64   Temp 36.1 °C (97 °F) (Temporal)   Resp 18        Physical Exam  General: not in acute distress  CV: regular rate and rhythm  Resp: non-labored breathing  GI: soft, active bowel sounds, non-tender to palpation, no rebound or guarding  Msk: moving all extremities   Derm: no jaundice    Labs  Lab Results   Component Value Date    WBC 9.9 04/01/2023    HGB 15.3 04/01/2023    HCT 46.7 04/01/2023    MCV 91 04/01/2023     04/01/2023     Lab Results   Component  "Value Date    GLUCOSE 184 (H) 03/02/2024    CALCIUM 9.7 03/02/2024     03/02/2024    K 4.2 03/02/2024    CO2 27 03/02/2024     03/02/2024    BUN 10 03/02/2024    CREATININE 1.14 03/02/2024     Lab Results   Component Value Date    ALT 27 04/01/2023    AST 16 04/01/2023    ALKPHOS 52 04/01/2023    BILITOT 0.5 04/01/2023     No results found for: \"INR\", \"PROTIME\"    Assessment/Plan   Zachary Early is a 53 y.o. male who presents to endoscopy for his initial average-risk screening colonoscopy.         Jean-Claude Cevallos MD  "

## 2024-09-25 LAB
LABORATORY COMMENT REPORT: NORMAL
PATH REPORT.FINAL DX SPEC: NORMAL
PATH REPORT.GROSS SPEC: NORMAL
PATH REPORT.RELEVANT HX SPEC: NORMAL
PATH REPORT.TOTAL CANCER: NORMAL

## 2024-11-07 NOTE — RESULT ENCOUNTER NOTE
Dear Zachary,    The single polyp that was resected during your recent colonoscopy was a completely benign hyperplastic polyp.  I recommend repeat colonoscopy in 10 years for routine screening.   Sincerely,  Jean-Claude Cevallos MD

## 2024-11-21 ENCOUNTER — PATIENT MESSAGE (OUTPATIENT)
Dept: PRIMARY CARE | Facility: CLINIC | Age: 54
End: 2024-11-21
Payer: COMMERCIAL

## 2024-11-21 DIAGNOSIS — E11.9 TYPE 2 DIABETES MELLITUS WITHOUT COMPLICATION, WITHOUT LONG-TERM CURRENT USE OF INSULIN (MULTI): ICD-10-CM

## 2024-11-21 RX ORDER — METFORMIN HYDROCHLORIDE 1000 MG/1
1000 TABLET, FILM COATED, EXTENDED RELEASE ORAL
Qty: 180 TABLET | Refills: 0 | Status: SHIPPED | OUTPATIENT
Start: 2024-11-21

## 2024-11-22 ENCOUNTER — TELEPHONE (OUTPATIENT)
Dept: PRIMARY CARE | Facility: CLINIC | Age: 54
End: 2024-11-22
Payer: COMMERCIAL

## 2024-11-22 NOTE — TELEPHONE ENCOUNTER
Fax received from pharmacy, Metformin not covered, insurance will cover Glucophage XR 500mg 2 tabs PO BID or Metformin can be PA. Did you want to switch or MA to do PA?

## 2024-11-22 NOTE — TELEPHONE ENCOUNTER
Assessment:     Well adolescent  1  Health check for child over 34 days old     2  Exercise counseling     3  Nutritional counseling     4  Allergic conjunctivitis, unspecified laterality  ketotifen (ZADITOR) 0 025 % ophthalmic solution   5  Encounter for immunization  HPV VACCINE 9 VALENT IM (GARDASIL)        Plan:         1  Anticipatory guidance discussed  Specific topics reviewed: bicycle helmets, drugs, ETOH, and tobacco, importance of regular dental care, importance of regular exercise, importance of varied diet, limit TV, media violence, minimize junk food, puberty, seat belts and sex; STD and pregnancy prevention  2  Development: appropriate for age    1  Immunizations today: per orders  4  Follow-up visit in 1 year for next well child visit, or sooner as needed  5  Gyn: not sexually active  Counseled on importance of safe sexual practices  Menearche: 17year old  Menses: every month and last 4-5 days  Moderate bleeding    Subjective:     Lurdes Ochoa is a 15 y o  female who is here for this well-child visit  Current Issues:  Current concerns include conjunctival injection that waxes and wanes  Discussed potential causes of conjunctivitis  No sick contacts  Improves with visine allergic drops  regular periods, no issues and menarche at 17years old    The following portions of the patient's history were reviewed and updated as appropriate: allergies, current medications, past family history, past medical history, past social history, past surgical history and problem list     Well Child Assessment:  History was provided by the mother  Kiana lives with her mother and brother  Interval problems do not include chronic stress at home, recent illness or recent injury  Nutrition  Types of intake include fruits, vegetables and eggs  Dental  The patient has a dental home  The patient brushes teeth regularly  The patient flosses regularly  Last dental exam was 6-12 months ago  Request for PA for [employer] received via fax. Placed in MA task basket    Elimination  Elimination problems do not include constipation or diarrhea  Behavioral  Behavioral issues do not include hitting, lying frequently, misbehaving with peers, misbehaving with siblings or performing poorly at school  Sleep  Average sleep duration is 10 hours  The patient does not snore  There are no sleep problems  Safety  There is no smoking in the home  Home has working smoke alarms? yes  Home has working carbon monoxide alarms? yes  There is no gun in home  School  Current grade level is 10th  Current school district is [employer]  There are no signs of learning disabilities  Child is doing well in school  Screening  There are no risk factors related to diet  There are no risk factors at school  Social  Sibling interactions are good  Objective: There were no vitals filed for this visit  Growth parameters are noted and are appropriate for age  Wt Readings from Last 1 Encounters:   03/04/20 49 9 kg (110 lb) (44 %, Z= -0 15)*     * Growth percentiles are based on CDC (Girls, 2-20 Years) data  Ht Readings from Last 1 Encounters:   08/02/19 4' 11 6" (8 %, Z= -1 40)*     * Growth percentiles are based on CDC (Girls, 2-20 Years) data  There is no height or weight on file to calculate BMI  There were no vitals filed for this visit  No exam data present    Physical Exam  Constitutional:       General: She is not in acute distress  Appearance: She is well-developed  She is not diaphoretic  HENT:      Head: Normocephalic and atraumatic  Right Ear: Tympanic membrane, ear canal and external ear normal  There is no impacted cerumen  Left Ear: Tympanic membrane, ear canal and external ear normal  There is no impacted cerumen  Nose: Nose normal       Mouth/Throat:      Mouth: Mucous membranes are moist       Pharynx: Oropharynx is clear  No oropharyngeal exudate  Eyes:      Pupils: Pupils are equal, round, and reactive to light     Neck: Vascular: No JVD  Cardiovascular:      Rate and Rhythm: Normal rate and regular rhythm  Heart sounds: Normal heart sounds  No murmur  No friction rub  No gallop  Pulmonary:      Effort: Pulmonary effort is normal  No respiratory distress  Breath sounds: Normal breath sounds  No wheezing or rales  Chest:      Chest wall: No tenderness  Abdominal:      General: Bowel sounds are normal  There is no distension  Palpations: Abdomen is soft  Tenderness: There is no abdominal tenderness  There is no guarding or rebound  Musculoskeletal: Normal range of motion  General: No swelling, tenderness or signs of injury  Right lower leg: No edema  Left lower leg: No edema  Lymphadenopathy:      Cervical: No cervical adenopathy  Skin:     General: Skin is warm and dry  Coloration: Skin is not jaundiced or pale  Findings: No erythema or rash  Neurological:      Mental Status: She is alert and oriented to person, place, and time  Cranial Nerves: No cranial nerve deficit     Psychiatric:         Behavior: Behavior normal

## 2025-03-09 LAB
ALBUMIN SERPL-MCNC: 4.3 G/DL (ref 3.6–5.1)
ALP SERPL-CCNC: 58 U/L (ref 35–144)
ALT SERPL-CCNC: 62 U/L (ref 9–46)
ANION GAP SERPL CALCULATED.4IONS-SCNC: 8 MMOL/L (CALC) (ref 7–17)
AST SERPL-CCNC: 28 U/L (ref 10–35)
BILIRUB SERPL-MCNC: 0.7 MG/DL (ref 0.2–1.2)
BUN SERPL-MCNC: 9 MG/DL (ref 7–25)
CALCIUM SERPL-MCNC: 9 MG/DL (ref 8.6–10.3)
CHLORIDE SERPL-SCNC: 100 MMOL/L (ref 98–110)
CHOLEST SERPL-MCNC: 126 MG/DL
CHOLEST/HDLC SERPL: 3.1 (CALC)
CO2 SERPL-SCNC: 28 MMOL/L (ref 20–32)
CREAT SERPL-MCNC: 0.84 MG/DL (ref 0.7–1.3)
EGFRCR SERPLBLD CKD-EPI 2021: 105 ML/MIN/1.73M2
EST. AVERAGE GLUCOSE BLD GHB EST-MCNC: 214 MG/DL
EST. AVERAGE GLUCOSE BLD GHB EST-SCNC: 11.9 MMOL/L
GLUCOSE SERPL-MCNC: 215 MG/DL (ref 65–99)
HBA1C MFR BLD: 9.1 % OF TOTAL HGB
HDLC SERPL-MCNC: 41 MG/DL
LDLC SERPL CALC-MCNC: 65 MG/DL (CALC)
NONHDLC SERPL-MCNC: 85 MG/DL (CALC)
POTASSIUM SERPL-SCNC: 4.3 MMOL/L (ref 3.5–5.3)
PROT SERPL-MCNC: 6.7 G/DL (ref 6.1–8.1)
SODIUM SERPL-SCNC: 136 MMOL/L (ref 135–146)
TESTOST FREE SERPL-MCNC: NORMAL PG/ML
TESTOST SERPL-MCNC: NORMAL NG/DL
TRIGL SERPL-MCNC: 112 MG/DL

## 2025-03-10 DIAGNOSIS — E78.3 MIXED HYPERGLYCERIDEMIA: ICD-10-CM

## 2025-03-10 DIAGNOSIS — F33.41 RECURRENT MAJOR DEPRESSIVE DISORDER, IN PARTIAL REMISSION (CMS-HCC): ICD-10-CM

## 2025-03-14 ENCOUNTER — APPOINTMENT (OUTPATIENT)
Dept: PRIMARY CARE | Facility: CLINIC | Age: 55
End: 2025-03-14
Payer: COMMERCIAL

## 2025-03-14 VITALS
HEART RATE: 88 BPM | WEIGHT: 207 LBS | OXYGEN SATURATION: 96 % | HEIGHT: 70 IN | BODY MASS INDEX: 29.63 KG/M2 | SYSTOLIC BLOOD PRESSURE: 120 MMHG | DIASTOLIC BLOOD PRESSURE: 76 MMHG

## 2025-03-14 DIAGNOSIS — Z00.00 WELL ADULT EXAM: ICD-10-CM

## 2025-03-14 DIAGNOSIS — E11.9 TYPE 2 DIABETES MELLITUS WITHOUT COMPLICATION, WITHOUT LONG-TERM CURRENT USE OF INSULIN (MULTI): ICD-10-CM

## 2025-03-14 DIAGNOSIS — E78.3 MIXED HYPERGLYCERIDEMIA: ICD-10-CM

## 2025-03-14 DIAGNOSIS — F33.41 RECURRENT MAJOR DEPRESSIVE DISORDER, IN PARTIAL REMISSION (CMS-HCC): ICD-10-CM

## 2025-03-14 DIAGNOSIS — H91.93 DECREASED HEARING OF BOTH EARS: Primary | ICD-10-CM

## 2025-03-14 DIAGNOSIS — H93.13 TINNITUS OF BOTH EARS: ICD-10-CM

## 2025-03-14 RX ORDER — TIRZEPATIDE 2.5 MG/.5ML
2.5 INJECTION, SOLUTION SUBCUTANEOUS WEEKLY
Qty: 2 ML | Refills: 0 | Status: SHIPPED | OUTPATIENT
Start: 2025-03-14

## 2025-03-14 RX ORDER — FLUOXETINE HYDROCHLORIDE 40 MG/1
40 CAPSULE ORAL DAILY
Qty: 90 CAPSULE | Refills: 1 | Status: SHIPPED | OUTPATIENT
Start: 2025-03-14 | End: 2025-09-10

## 2025-03-14 RX ORDER — BLOOD-GLUCOSE SENSOR
EACH MISCELLANEOUS
Qty: 9 EACH | Refills: 1 | Status: SHIPPED | OUTPATIENT
Start: 2025-03-14 | End: 2025-03-18

## 2025-03-14 RX ORDER — BLOOD-GLUCOSE,RECEIVER,CONT
EACH MISCELLANEOUS
Qty: 1 EACH | Refills: 0 | Status: SHIPPED | OUTPATIENT
Start: 2025-03-14 | End: 2025-03-18

## 2025-03-14 RX ORDER — FLUOXETINE HYDROCHLORIDE 40 MG/1
40 CAPSULE ORAL DAILY
Qty: 90 CAPSULE | Refills: 0 | OUTPATIENT
Start: 2025-03-14

## 2025-03-14 ASSESSMENT — PROMIS GLOBAL HEALTH SCALE
RATE_SOCIAL_SATISFACTION: EXCELLENT
CARRYOUT_SOCIAL_ACTIVITIES: EXCELLENT
EMOTIONAL_PROBLEMS: RARELY
RATE_GENERAL_HEALTH: GOOD
RATE_QUALITY_OF_LIFE: GOOD
RATE_AVERAGE_PAIN: 0
CARRYOUT_PHYSICAL_ACTIVITIES: COMPLETELY
RATE_PHYSICAL_HEALTH: GOOD
RATE_MENTAL_HEALTH: GOOD

## 2025-03-14 ASSESSMENT — PATIENT HEALTH QUESTIONNAIRE - PHQ9
2. FEELING DOWN, DEPRESSED OR HOPELESS: NOT AT ALL
SUM OF ALL RESPONSES TO PHQ9 QUESTIONS 1 AND 2: 0
1. LITTLE INTEREST OR PLEASURE IN DOING THINGS: NOT AT ALL

## 2025-03-14 NOTE — PROGRESS NOTES
"  Subjective   Patient ID: Zachary Early is a 54 y.o. male who presents for Annual Exam (Annual Physical-Review Labs, Would like to discuss metformin usage. ).  He is sick of the side effects from metformin, all GI.    Past Medical, Surgical, Social and Family Hx reviewed-Yes    Any acute complaints?    None other than GI sxs from metformin.  He knows his diet could be better.    Any chronic issues addressed today or Rx refills needed?    Yes, see below      Colon CA screening Hx 2024  Labs reviewed last week, A1C up to 9.1 again  Up to date on immunizations yes  Dentist in the past year yes    ROS:  HEENT negative  GI negative   negative  Cardiac negative  Resp negative  Sexual Activity no concerns  Skin negative      PE:  /76   Pulse 88   Ht 1.765 m (5' 9.5\")   Wt 93.9 kg (207 lb)   SpO2 96%   BMI 30.13 kg/m²   Alert adult man, NAD  HEENT clear  Neck supple, No LAD  Heart RRR no murmur  Lungs CTA bilat  Abdomen benign, normal BS, soft NT/ND  Skin warm, dry, clear  Neuro grossly normal, gait WNL  Affect pleasant and appropriate, memory and judgement WNL    A1C last week 9.1, other labs 3/8/25 reviewed    Assessment/Plan   Assessment & Plan  Well adult exam  Reviewed HM and vaccines        Type 2 diabetes mellitus without complication, without long-term current use of insulin (Multi)    Reviewed wt loss Rx options.  Discussed possible side effects of the semaglutides, how dosing starts/progresses.  Pt knows that early studies show significant chance to gain back any wt lost, after stopping the meds.  If not covered and one attempt at prior auth ineffective, pt will be responsible for calling her ins to see what her covered options are, if any.  If the Rx is covered, call in 4 weeks to let me know if prefers to stay same dose or go up a step.  Then I will do 2 months Rx and follow up in person in 3 months.  Call sooner if any problems.    Orders:    tirzepatide (Mounjaro) 2.5 mg/0.5 mL pen injector; " Inject 2.5 mg under the skin 1 (one) time per week.  Okay to decrease metformin by half once he has mounjaro.  Call in one month to increase dose.  Offered CGM  Mixed hyperglyceridemia  Stable        Recurrent major depressive disorder, in partial remission (CMS-HCC)  Stable on current meds  Orders:    FLUoxetine (PROzac) 40 mg capsule; Take 1 capsule (40 mg) by mouth once daily.    Decreased hearing of both ears    Orders:    Referral to Audiology; Future    Tinnitus of both ears    Orders:    Referral to Audiology; Future

## 2025-03-16 LAB
ALBUMIN SERPL-MCNC: 4.3 G/DL (ref 3.6–5.1)
ALP SERPL-CCNC: 58 U/L (ref 35–144)
ALT SERPL-CCNC: 62 U/L (ref 9–46)
ANION GAP SERPL CALCULATED.4IONS-SCNC: 8 MMOL/L (CALC) (ref 7–17)
AST SERPL-CCNC: 28 U/L (ref 10–35)
BILIRUB SERPL-MCNC: 0.7 MG/DL (ref 0.2–1.2)
BUN SERPL-MCNC: 9 MG/DL (ref 7–25)
CALCIUM SERPL-MCNC: 9 MG/DL (ref 8.6–10.3)
CHLORIDE SERPL-SCNC: 100 MMOL/L (ref 98–110)
CHOLEST SERPL-MCNC: 126 MG/DL
CHOLEST/HDLC SERPL: 3.1 (CALC)
CO2 SERPL-SCNC: 28 MMOL/L (ref 20–32)
CREAT SERPL-MCNC: 0.84 MG/DL (ref 0.7–1.3)
EGFRCR SERPLBLD CKD-EPI 2021: 105 ML/MIN/1.73M2
EST. AVERAGE GLUCOSE BLD GHB EST-MCNC: 214 MG/DL
EST. AVERAGE GLUCOSE BLD GHB EST-SCNC: 11.9 MMOL/L
GLUCOSE SERPL-MCNC: 215 MG/DL (ref 65–99)
HBA1C MFR BLD: 9.1 % OF TOTAL HGB
HDLC SERPL-MCNC: 41 MG/DL
LDLC SERPL CALC-MCNC: 65 MG/DL (CALC)
NONHDLC SERPL-MCNC: 85 MG/DL (CALC)
POTASSIUM SERPL-SCNC: 4.3 MMOL/L (ref 3.5–5.3)
PROT SERPL-MCNC: 6.7 G/DL (ref 6.1–8.1)
SODIUM SERPL-SCNC: 136 MMOL/L (ref 135–146)
TESTOST FREE SERPL-MCNC: 55.6 PG/ML (ref 35–155)
TESTOST SERPL-MCNC: 282 NG/DL (ref 250–1100)
TRIGL SERPL-MCNC: 112 MG/DL

## 2025-03-17 ENCOUNTER — PATIENT MESSAGE (OUTPATIENT)
Dept: PRIMARY CARE | Facility: CLINIC | Age: 55
End: 2025-03-17
Payer: COMMERCIAL

## 2025-03-17 DIAGNOSIS — E11.9 TYPE 2 DIABETES MELLITUS WITHOUT COMPLICATION, WITHOUT LONG-TERM CURRENT USE OF INSULIN (MULTI): Primary | ICD-10-CM

## 2025-03-17 NOTE — TELEPHONE ENCOUNTER
Patient does need lipitor refilled patient was last seen on 3/14/2025 please send to [email or web address]

## 2025-03-18 RX ORDER — ATORVASTATIN CALCIUM 10 MG/1
10 TABLET, FILM COATED ORAL DAILY
Qty: 90 TABLET | Refills: 1 | Status: SHIPPED | OUTPATIENT
Start: 2025-03-18

## 2025-03-18 RX ORDER — BLOOD-GLUCOSE SENSOR
EACH MISCELLANEOUS
Qty: 6 EACH | Refills: 1 | Status: SHIPPED | OUTPATIENT
Start: 2025-03-18

## 2025-03-18 RX ORDER — BLOOD-GLUCOSE,RECEIVER,CONT
EACH MISCELLANEOUS
Qty: 1 EACH | Refills: 0 | Status: SHIPPED | OUTPATIENT
Start: 2025-03-18

## 2025-03-23 NOTE — ASSESSMENT & PLAN NOTE
Stable on current meds  Orders:    FLUoxetine (PROzac) 40 mg capsule; Take 1 capsule (40 mg) by mouth once daily.

## 2025-03-23 NOTE — ASSESSMENT & PLAN NOTE
Reviewed wt loss Rx options.  Discussed possible side effects of the semaglutides, how dosing starts/progresses.  Pt knows that early studies show significant chance to gain back any wt lost, after stopping the meds.  If not covered and one attempt at prior auth ineffective, pt will be responsible for calling her ins to see what her covered options are, if any.  If the Rx is covered, call in 4 weeks to let me know if prefers to stay same dose or go up a step.  Then I will do 2 months Rx and follow up in person in 3 months.  Call sooner if any problems.    Orders:    tirzepatide (Mounjaro) 2.5 mg/0.5 mL pen injector; Inject 2.5 mg under the skin 1 (one) time per week.  Okay to decrease metformin by half once he has mounjaro.  Call in one month to increase dose.  Offered CGM

## 2025-04-03 DIAGNOSIS — E78.3 MIXED HYPERGLYCERIDEMIA: ICD-10-CM

## 2025-04-03 RX ORDER — TIRZEPATIDE 5 MG/.5ML
5 INJECTION, SOLUTION SUBCUTANEOUS WEEKLY
Qty: 2 ML | Refills: 2 | Status: SHIPPED | OUTPATIENT
Start: 2025-04-03 | End: 2025-04-04 | Stop reason: SDUPTHER

## 2025-04-03 RX ORDER — ATORVASTATIN CALCIUM 10 MG/1
10 TABLET, FILM COATED ORAL DAILY
Qty: 90 TABLET | Refills: 1 | Status: SHIPPED | OUTPATIENT
Start: 2025-04-03

## 2025-04-04 ENCOUNTER — PATIENT MESSAGE (OUTPATIENT)
Dept: PRIMARY CARE | Facility: CLINIC | Age: 55
End: 2025-04-04
Payer: COMMERCIAL

## 2025-04-04 DIAGNOSIS — E78.3 MIXED HYPERGLYCERIDEMIA: ICD-10-CM

## 2025-04-04 DIAGNOSIS — E11.9 TYPE 2 DIABETES MELLITUS WITHOUT COMPLICATION, WITHOUT LONG-TERM CURRENT USE OF INSULIN: Primary | ICD-10-CM

## 2025-04-04 RX ORDER — TIRZEPATIDE 5 MG/.5ML
5 INJECTION, SOLUTION SUBCUTANEOUS WEEKLY
Qty: 2 ML | Refills: 2 | Status: SHIPPED | OUTPATIENT
Start: 2025-04-04

## 2025-04-30 ENCOUNTER — CLINICAL SUPPORT (OUTPATIENT)
Dept: AUDIOLOGY | Facility: CLINIC | Age: 55
End: 2025-04-30
Payer: COMMERCIAL

## 2025-04-30 ENCOUNTER — OFFICE VISIT (OUTPATIENT)
Dept: OTOLARYNGOLOGY | Facility: CLINIC | Age: 55
End: 2025-04-30
Payer: COMMERCIAL

## 2025-04-30 VITALS
SYSTOLIC BLOOD PRESSURE: 126 MMHG | WEIGHT: 203 LBS | BODY MASS INDEX: 28.42 KG/M2 | TEMPERATURE: 97.9 F | HEIGHT: 71 IN | HEART RATE: 67 BPM | DIASTOLIC BLOOD PRESSURE: 85 MMHG

## 2025-04-30 DIAGNOSIS — H93.13 TINNITUS OF BOTH EARS: Primary | ICD-10-CM

## 2025-04-30 DIAGNOSIS — H93.13 TINNITUS, BILATERAL: Primary | ICD-10-CM

## 2025-04-30 DIAGNOSIS — H90.3 SNHL (SENSORY-NEURAL HEARING LOSS), ASYMMETRICAL: ICD-10-CM

## 2025-04-30 DIAGNOSIS — H90.3 ASYMMETRIC SNHL (SENSORINEURAL HEARING LOSS): ICD-10-CM

## 2025-04-30 PROCEDURE — 3074F SYST BP LT 130 MM HG: CPT | Performed by: NURSE PRACTITIONER

## 2025-04-30 PROCEDURE — 3079F DIAST BP 80-89 MM HG: CPT | Performed by: NURSE PRACTITIONER

## 2025-04-30 PROCEDURE — 92557 COMPREHENSIVE HEARING TEST: CPT | Performed by: AUDIOLOGIST

## 2025-04-30 PROCEDURE — 99213 OFFICE O/P EST LOW 20 MIN: CPT | Performed by: NURSE PRACTITIONER

## 2025-04-30 PROCEDURE — 99203 OFFICE O/P NEW LOW 30 MIN: CPT | Performed by: NURSE PRACTITIONER

## 2025-04-30 PROCEDURE — 92550 TYMPANOMETRY & REFLEX THRESH: CPT | Mod: 52 | Performed by: AUDIOLOGIST

## 2025-04-30 PROCEDURE — 3008F BODY MASS INDEX DOCD: CPT | Performed by: NURSE PRACTITIONER

## 2025-04-30 PROCEDURE — 1036F TOBACCO NON-USER: CPT | Performed by: NURSE PRACTITIONER

## 2025-04-30 SDOH — ECONOMIC STABILITY: FOOD INSECURITY: WITHIN THE PAST 12 MONTHS, THE FOOD YOU BOUGHT JUST DIDN'T LAST AND YOU DIDN'T HAVE MONEY TO GET MORE.: NEVER TRUE

## 2025-04-30 SDOH — ECONOMIC STABILITY: FOOD INSECURITY: WITHIN THE PAST 12 MONTHS, YOU WORRIED THAT YOUR FOOD WOULD RUN OUT BEFORE YOU GOT MONEY TO BUY MORE.: NEVER TRUE

## 2025-04-30 ASSESSMENT — COLUMBIA-SUICIDE SEVERITY RATING SCALE - C-SSRS
1. IN THE PAST MONTH, HAVE YOU WISHED YOU WERE DEAD OR WISHED YOU COULD GO TO SLEEP AND NOT WAKE UP?: NO
6. HAVE YOU EVER DONE ANYTHING, STARTED TO DO ANYTHING, OR PREPARED TO DO ANYTHING TO END YOUR LIFE?: NO
2. HAVE YOU ACTUALLY HAD ANY THOUGHTS OF KILLING YOURSELF?: NO

## 2025-04-30 ASSESSMENT — LIFESTYLE VARIABLES
HOW OFTEN DO YOU HAVE A DRINK CONTAINING ALCOHOL: 4 OR MORE TIMES A WEEK
AUDIT-C TOTAL SCORE: 4
SKIP TO QUESTIONS 9-10: 1
HOW MANY STANDARD DRINKS CONTAINING ALCOHOL DO YOU HAVE ON A TYPICAL DAY: 1 OR 2
HOW OFTEN DO YOU HAVE SIX OR MORE DRINKS ON ONE OCCASION: NEVER

## 2025-04-30 ASSESSMENT — PAIN SCALES - GENERAL: PAINLEVEL_OUTOF10: 0-NO PAIN

## 2025-04-30 ASSESSMENT — ENCOUNTER SYMPTOMS
LOSS OF SENSATION IN FEET: 0
DEPRESSION: 0
OCCASIONAL FEELINGS OF UNSTEADINESS: 0

## 2025-04-30 NOTE — PATIENT INSTRUCTIONS
The likely etiology of tinnitus was reviewed. The various masking and distraction techniques were discussed including the following:  -Masking the sounds with TV, fan, radio or sound machine.  -Hearing aids will help bring sound in which should decrease tinnitus.  -Lipoflavonoid and other over-the-counter supplements to help with tinnitus.  We did discuss that there is not great evidence or data on these.  -Acupuncture, massage, chiropractic management  - The Lenire. Lenire can help soothe tinnitus. Two 30-minute sessions per day. A dual mode combination of sound and tongue pulses to help your brain to stop focusing on tinnitus. You can learn more about this through [facility] with audiologist Lula Zheng. Contact number is [phone].  - Tinnitus retraining therapy: [facility]. The [facility] also has a tinnitus management clinic and can be reached at [phone].     Welcome to Carol Abernathy's clinic. We are here to assist you through your ENT care at [facility].  Carol is a Nurse Practitioner who specializes in General ENT. This means that she specializes in taking care of patients with usual ENT issues such as nasal congestion, allergy symptoms, sinusitis, hearing loss, ear infections, ear wax removal, hoarseness, sore throat, throat infections, reflux and some swallowing issues. She also sees patients regarding dizziness and vertigo.   Mily is Carol's  and she answers the office phone from 7:30am-4pm Mon-Fri. Call [phone]. She can help you with scheduling of appointments, and general questions and information. You may need to leave a message if she is helping another patient. In this case, someone from the team will call you back the same day if you leave your message before 3pm, or the next business morning.  Carol currently sees patients at [facility] on Mondays, Wednesdays and Thursdays.  She works closely  with audiologists to solve issues with hearing. She is also in very close contact with her collaborative physicians. Dr. Young, a surgeon who specializes in general ENT and rhinology. She also works closely with otologist (ear surgeon) Dr. Michelle and head and neck surgery Dr. Ramos.   Others who may be included in your care are dieticians, social workers, allergists, gastroenterologists, neurologists, and physical therapists. Carol will provide these referrals as needed. Please let her know if you would like to request a specific referral.  Carol makes every effort to run on time for your appointments. Therefore, if you are more than 15 minutes late unrelated to a scan or another appointment such as therapy or audiology, your appointment will need to be rescheduled for another day. We appreciate your understanding.   We look forward to working with you to meet your healthcare goals.
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
## 2025-06-26 DIAGNOSIS — E11.9 TYPE 2 DIABETES MELLITUS WITHOUT COMPLICATION, WITHOUT LONG-TERM CURRENT USE OF INSULIN: ICD-10-CM

## 2025-06-27 DIAGNOSIS — E11.9 TYPE 2 DIABETES MELLITUS WITHOUT COMPLICATION, WITHOUT LONG-TERM CURRENT USE OF INSULIN: ICD-10-CM

## 2025-06-27 RX ORDER — TIRZEPATIDE 5 MG/.5ML
INJECTION, SOLUTION SUBCUTANEOUS
Qty: 2 ML | Refills: 0 | Status: SHIPPED | OUTPATIENT
Start: 2025-06-27

## 2025-07-07 NOTE — TELEPHONE ENCOUNTER
Left vm for pt to call office back, he has a appt scheduled for 7/11, does he need Metformin prior to appt?

## 2025-07-11 ENCOUNTER — APPOINTMENT (OUTPATIENT)
Dept: PRIMARY CARE | Facility: CLINIC | Age: 55
End: 2025-07-11
Payer: COMMERCIAL

## 2025-07-11 VITALS
WEIGHT: 189 LBS | SYSTOLIC BLOOD PRESSURE: 96 MMHG | OXYGEN SATURATION: 96 % | DIASTOLIC BLOOD PRESSURE: 70 MMHG | HEART RATE: 73 BPM | BODY MASS INDEX: 26.74 KG/M2

## 2025-07-11 DIAGNOSIS — E78.3 MIXED HYPERGLYCERIDEMIA: ICD-10-CM

## 2025-07-11 DIAGNOSIS — E11.9 TYPE 2 DIABETES MELLITUS WITHOUT COMPLICATION, WITHOUT LONG-TERM CURRENT USE OF INSULIN: Primary | ICD-10-CM

## 2025-07-11 LAB — POC HEMOGLOBIN A1C: 6.5 % (ref 4.2–6.5)

## 2025-07-11 PROCEDURE — 3044F HG A1C LEVEL LT 7.0%: CPT | Performed by: FAMILY MEDICINE

## 2025-07-11 PROCEDURE — 3074F SYST BP LT 130 MM HG: CPT | Performed by: FAMILY MEDICINE

## 2025-07-11 PROCEDURE — 99213 OFFICE O/P EST LOW 20 MIN: CPT | Performed by: FAMILY MEDICINE

## 2025-07-11 PROCEDURE — 83036 HEMOGLOBIN GLYCOSYLATED A1C: CPT | Performed by: FAMILY MEDICINE

## 2025-07-11 PROCEDURE — 3078F DIAST BP <80 MM HG: CPT | Performed by: FAMILY MEDICINE

## 2025-07-11 RX ORDER — METFORMIN HYDROCHLORIDE 500 MG/1
TABLET, EXTENDED RELEASE ORAL
Qty: 360 TABLET | Refills: 0 | OUTPATIENT
Start: 2025-07-11

## 2025-07-11 RX ORDER — ATORVASTATIN CALCIUM 10 MG/1
10 TABLET, FILM COATED ORAL DAILY
Qty: 90 TABLET | Refills: 1 | Status: SHIPPED | OUTPATIENT
Start: 2025-07-11

## 2025-07-11 RX ORDER — METFORMIN HYDROCHLORIDE 500 MG/1
500 TABLET, EXTENDED RELEASE ORAL
Qty: 360 TABLET | Refills: 1 | Status: SHIPPED | OUTPATIENT
Start: 2025-07-11 | End: 2026-07-11

## 2025-07-11 RX ORDER — TIRZEPATIDE 5 MG/.5ML
5 INJECTION, SOLUTION SUBCUTANEOUS WEEKLY
Qty: 2 ML | Refills: 5 | Status: SHIPPED | OUTPATIENT
Start: 2025-07-11

## 2025-07-11 ASSESSMENT — ENCOUNTER SYMPTOMS
WEIGHT LOSS: 0
SEIZURES: 0
BLURRED VISION: 0
SPEECH DIFFICULTY: 0
CONFUSION: 0
BLACKOUTS: 0
WEAKNESS: 0
HUNGER: 0
HEADACHES: 0
POLYPHAGIA: 0
VISUAL CHANGE: 0
SWEATS: 0
NERVOUS/ANXIOUS: 0
FATIGUE: 0
TREMORS: 0
DIZZINESS: 0
POLYDIPSIA: 0

## 2025-07-11 NOTE — ASSESSMENT & PLAN NOTE
Much improved  Orders:    metFORMIN  mg 24 hr tablet; Take 1 tablet (500 mg) by mouth 2 times daily (morning and late afternoon). Do not crush, chew, or split.    tirzepatide (Mounjaro) 5 mg/0.5 mL pen injector; Inject 5 mg under the skin 1 (one) time per week.

## 2025-07-11 NOTE — PROGRESS NOTES
Subjective   Patient ID: Zachary Early is a 54 y.o. male who presents for Diabetes (Follow up.).  He has been using his Mounjaro weekly, thinks his A1C will be better than 4 months ago when it was 9.1.  He usually checks it daily.  It can be 120s, or up to 160s.     Histories reviewed      Objective   BP 99/63   Pulse 73   Wt 85.7 kg (189 lb)   SpO2 96%   BMI 26.74 kg/m²    Physical Exam    Alert adult, NAD  Affect pleasant  Heart RRR no murmur  Lungs CTA bilat    A1C today in office 6.5!  Assessment & Plan  Type 2 diabetes mellitus without complication, without long-term current use of insulin  Much improved  Orders:    metFORMIN  mg 24 hr tablet; Take 1 tablet (500 mg) by mouth 2 times daily (morning and late afternoon). Do not crush, chew, or split.    tirzepatide (Mounjaro) 5 mg/0.5 mL pen injector; Inject 5 mg under the skin 1 (one) time per week.    Mixed hyperglyceridemia    Orders:    atorvastatin (Lipitor) 10 mg tablet; Take 1 tablet (10 mg) by mouth once daily.    Follow up 6 months    Answers submitted by the patient for this visit:  Diabetes Questionnaire (Submitted on 7/11/2025)  Chief Complaint: Diabetes problem  Diabetes type: type 2  MedicAlert ID: No  Disease duration: 3 Years  blurred vision: No  chest pain: No  fatigue: No  foot paresthesias: No  foot ulcerations: No  polydipsia: No  polyphagia: No  polyuria: No  visual change: No  weakness: No  weight loss: No  Symptom course: stable  confusion: No  speech difficulty: No  dizziness: No  nervous/anxious: No  headaches: No  hunger: No  mood changes: No  pallor: No  seizures: No  tremors: No  sleepiness: No  sweats: No  blackouts: No  hospitalization: No  nocturnal hypoglycemia: No  required assistance: No  required glucagon: No  CVA: No  heart disease: No  impotence: No  nephropathy: No  peripheral neuropathy: No  PVD: No  retinopathy: No  CAD risks: no known risk factors  Current treatments: oral agent (dual therapy)  Treatment  compliance: all of the time  Home blood tests: 1-2 x per day  Home urines: <1 x per month  Monitoring compliance: excellent  Blood glucose trend: decreasing steadily  breakfast time: 8-9 am  breakfast glucose level:   lunch time: 12-1 pm  dinner time: 5-6 pm  Bedtime: 9-10 pm  High score: 110-130  Overall:   Weight trend: stable  Current diet: diabetic, generally healthy  Meal planning: ADA exchanges  Exercise: weekly  Dietitian visit: Yes  Eye exam current: Yes  Sees podiatrist: No

## 2025-09-02 DIAGNOSIS — F33.41 RECURRENT MAJOR DEPRESSIVE DISORDER, IN PARTIAL REMISSION: ICD-10-CM

## 2025-09-02 RX ORDER — FLUOXETINE HYDROCHLORIDE 40 MG/1
40 CAPSULE ORAL DAILY
Qty: 90 CAPSULE | Refills: 1 | Status: SHIPPED | OUTPATIENT
Start: 2025-09-02 | End: 2026-03-01

## 2026-01-16 ENCOUNTER — APPOINTMENT (OUTPATIENT)
Dept: PRIMARY CARE | Facility: CLINIC | Age: 56
End: 2026-01-16
Payer: COMMERCIAL